# Patient Record
Sex: FEMALE | Race: WHITE | NOT HISPANIC OR LATINO | RURAL
[De-identification: names, ages, dates, MRNs, and addresses within clinical notes are randomized per-mention and may not be internally consistent; named-entity substitution may affect disease eponyms.]

---

## 2023-02-13 ENCOUNTER — TELEPHONE ENCOUNTER (OUTPATIENT)
Dept: RURAL CLINIC 2 | Facility: CLINIC | Age: 38
End: 2023-02-13

## 2023-02-16 ENCOUNTER — OFFICE VISIT (OUTPATIENT)
Dept: URBAN - METROPOLITAN AREA CLINIC 105 | Facility: CLINIC | Age: 38
End: 2023-02-16

## 2023-02-27 ENCOUNTER — WEB ENCOUNTER (OUTPATIENT)
Dept: RURAL CLINIC 2 | Facility: CLINIC | Age: 38
End: 2023-02-27

## 2023-02-27 ENCOUNTER — OFFICE VISIT (OUTPATIENT)
Dept: RURAL CLINIC 2 | Facility: CLINIC | Age: 38
End: 2023-02-27
Payer: COMMERCIAL

## 2023-02-27 ENCOUNTER — CLAIMS CREATED FROM THE CLAIM WINDOW (OUTPATIENT)
Dept: RURAL CLINIC 2 | Facility: CLINIC | Age: 38
End: 2023-02-27
Payer: COMMERCIAL

## 2023-02-27 VITALS
DIASTOLIC BLOOD PRESSURE: 97 MMHG | SYSTOLIC BLOOD PRESSURE: 136 MMHG | TEMPERATURE: 96.8 F | BODY MASS INDEX: 36.19 KG/M2 | HEIGHT: 64 IN | WEIGHT: 212 LBS | HEART RATE: 109 BPM

## 2023-02-27 DIAGNOSIS — R19.5 MUCUS IN STOOL: ICD-10-CM

## 2023-02-27 DIAGNOSIS — R12 HEARTBURN: ICD-10-CM

## 2023-02-27 DIAGNOSIS — R11.0 NAUSEA: ICD-10-CM

## 2023-02-27 DIAGNOSIS — R10.9 RIGHT SIDED ABDOMINAL PAIN: ICD-10-CM

## 2023-02-27 DIAGNOSIS — R10.84 ABDOMINAL CRAMPING, GENERALIZED: ICD-10-CM

## 2023-02-27 PROBLEM — 285388000: Status: ACTIVE | Noted: 2023-02-27

## 2023-02-27 PROBLEM — 422587007: Status: ACTIVE | Noted: 2023-02-27

## 2023-02-27 PROBLEM — 16331000: Status: ACTIVE | Noted: 2023-02-27

## 2023-02-27 PROBLEM — 271864008: Status: ACTIVE | Noted: 2023-02-27

## 2023-02-27 PROCEDURE — 99243 OFF/OP CNSLTJ NEW/EST LOW 30: CPT | Performed by: INTERNAL MEDICINE

## 2023-02-27 PROCEDURE — 99243 OFF/OP CNSLTJ NEW/EST LOW 30: CPT | Performed by: NURSE PRACTITIONER

## 2023-02-27 PROCEDURE — 99203 OFFICE O/P NEW LOW 30 MIN: CPT | Performed by: INTERNAL MEDICINE

## 2023-02-27 RX ORDER — BUSPIRONE HYDROCHLORIDE 7.5 MG/1
1 TABLET TABLET ORAL TWICE A DAY
Status: ACTIVE | COMMUNITY

## 2023-02-27 RX ORDER — DOXYCYCLINE HYCLATE 100 MG/1
1 CAPSULE CAPSULE, GELATIN COATED ORAL ONCE A DAY
Status: ACTIVE | COMMUNITY

## 2023-02-27 RX ORDER — FLUOXETINE HYDROCHLORIDE 10 MG/1
1 CAPSULE CAPSULE ORAL ONCE A DAY
Status: ACTIVE | COMMUNITY

## 2023-02-27 RX ORDER — METFORMIN HYDROCHLORIDE 500 MG/1
1 TABLET WITH A MEAL TABLET, FILM COATED ORAL ONCE A DAY
Status: ACTIVE | COMMUNITY

## 2023-02-27 NOTE — HPI-TODAY'S VISIT:
The patient is a 37-year-old female who presents on referral from Stefanie Gilbert NP for abdominal pain and heartburn.  A copy of this document to be sent to the referring provider.  The patient was seen at [facility] emergency room in mid January for abdominal pain, nausea and  diarrhea and noncontrast CT scan of the abdomen revealed questionable mesenteric enteritis otherwise unremarkable.  After approximately 1-2 weeks her symptoms improved significantly.  She denies any further diarrhea.  She reports mild right-sided discomfort occurring infrequently.  Her bowel pattern is back to normal indicating 3-4 bowel movements per day typically starting out as solid and by the end of the day will be soft.  She denies melena or rectal bleeding.  She was treated for heartburn with omeprazole and made her sick as well as caused diarrhea.  She continues with intermittent heartburn a few times a week.  She recalls undergoing both an upper endoscopy and colonoscopy approximately 15 years ago for upper and lower GI symptoms at that time and essentially normal.  She denies a family history of digestive malignancies.

## 2023-05-10 ENCOUNTER — OFFICE VISIT (OUTPATIENT)
Dept: RURAL CLINIC 8 | Facility: CLINIC | Age: 38
End: 2023-05-10
Payer: COMMERCIAL

## 2023-05-10 ENCOUNTER — DASHBOARD ENCOUNTERS (OUTPATIENT)
Age: 38
End: 2023-05-10

## 2023-05-10 ENCOUNTER — LAB OUTSIDE AN ENCOUNTER (OUTPATIENT)
Dept: RURAL CLINIC 8 | Facility: CLINIC | Age: 38
End: 2023-05-10

## 2023-05-10 ENCOUNTER — WEB ENCOUNTER (OUTPATIENT)
Dept: RURAL CLINIC 2 | Facility: CLINIC | Age: 38
End: 2023-05-10

## 2023-05-10 VITALS
HEIGHT: 64 IN | SYSTOLIC BLOOD PRESSURE: 125 MMHG | DIASTOLIC BLOOD PRESSURE: 91 MMHG | TEMPERATURE: 97.8 F | HEART RATE: 99 BPM | BODY MASS INDEX: 36.88 KG/M2 | WEIGHT: 216 LBS

## 2023-05-10 DIAGNOSIS — R10.9 RIGHT SIDED ABDOMINAL PAIN: ICD-10-CM

## 2023-05-10 DIAGNOSIS — I88.0 MESENTERIC ADENITIS: ICD-10-CM

## 2023-05-10 DIAGNOSIS — K21.9 GERD WITHOUT ESOPHAGITIS: ICD-10-CM

## 2023-05-10 DIAGNOSIS — R12 HEARTBURN: ICD-10-CM

## 2023-05-10 DIAGNOSIS — R19.5 LOOSE STOOLS: ICD-10-CM

## 2023-05-10 DIAGNOSIS — Z90.49 HISTORY OF CHOLECYSTECTOMY: ICD-10-CM

## 2023-05-10 DIAGNOSIS — R19.7 DIARRHEA, UNSPECIFIED TYPE: ICD-10-CM

## 2023-05-10 PROBLEM — 428882003: Status: ACTIVE | Noted: 2023-05-10

## 2023-05-10 PROBLEM — 266435005: Status: ACTIVE | Noted: 2023-05-10

## 2023-05-10 PROCEDURE — 99214 OFFICE O/P EST MOD 30 MIN: CPT | Performed by: INTERNAL MEDICINE

## 2023-05-10 RX ORDER — DOXYCYCLINE HYCLATE 100 MG/1
1 CAPSULE CAPSULE, GELATIN COATED ORAL ONCE A DAY
Status: DISCONTINUED | COMMUNITY

## 2023-05-10 RX ORDER — BISACODYL 5 MG
TAKE 4 TABLET, DELAYED RELEASE (ENTERIC COATED) ORAL
Qty: 4 | OUTPATIENT
Start: 2023-05-10 | End: 2023-05-11

## 2023-05-10 RX ORDER — METFORMIN HYDROCHLORIDE 500 MG/1
1 TABLET WITH A MEAL TABLET, FILM COATED ORAL ONCE A DAY
Status: DISCONTINUED | COMMUNITY

## 2023-05-10 RX ORDER — FLUOXETINE HYDROCHLORIDE 10 MG/1
1 CAPSULE CAPSULE ORAL ONCE A DAY
Status: DISCONTINUED | COMMUNITY

## 2023-05-10 RX ORDER — SODIUM, POTASSIUM,MAG SULFATES 17.5-3.13G
177 ML SOLUTION, RECONSTITUTED, ORAL ORAL
Qty: 1 KIT | Refills: 0 | OUTPATIENT
Start: 2023-05-10 | End: 2023-05-11

## 2023-05-10 RX ORDER — BUSPIRONE HYDROCHLORIDE 7.5 MG/1
1 TABLET TABLET ORAL TWICE A DAY
Status: ACTIVE | COMMUNITY

## 2023-05-10 NOTE — HPI-TODAY'S VISIT:
The patient is a 37-year-old female who presents on referral from Stefanie Gilbert NP for abdominal pain and heartburn.  A copy of this document to be sent to the referring provider.  The patient was seen at [facility] emergency room in mid January for abdominal pain, nausea and  diarrhea and noncontrast CT scan of the abdomen revealed questionable mesenteric enteritis otherwise unremarkable.  After approximately 1-2 weeks her symptoms improved significantly.  She denies any further diarrhea.  She reports mild right-sided discomfort occurring infrequently.  Her bowel pattern is back to normal indicating 3-4 bowel movements per day typically starting out as solid and by the end of the day will be soft.  She denies melena or rectal bleeding.  She was treated for heartburn with omeprazole and made her sick as well as caused diarrhea.  She continues with intermittent heartburn a few times a week.  She recalls undergoing both an upper endoscopy and colonoscopy approximately 15 years ago for upper and lower GI symptoms at that time and essentially normal.  She denies a family history of digestive malignancies. -  05/10/2023: Patient returns for follow-up.  She was seen about 8 weeks or so by NP.  She had developed abdominal pain and was seen in the ER at [facility] were CT suggested mesenteric adenitis.  At that time were symptoms have markedly improved.  She returns to the office today for interval follow-up. "nel always had stomach issues since I was a child". - things are better than when she was in ER.  she describes right sided abdominal pain that "wraps around to my back".  mainly has heartburn bloating and "dumping syndrome".  when the right sided abdominal pain the diarrhea gets worse.  - pt reports to have loose stools multiple times a day, very gassy. soemtimes even before finishing a meal, she has to get up and have diarrhea. she occasionally has a normal stool on her period. vast majority of her bowel movements are loose.

## 2023-06-15 ENCOUNTER — LAB OUTSIDE AN ENCOUNTER (OUTPATIENT)
Dept: URBAN - METROPOLITAN AREA CLINIC 105 | Facility: CLINIC | Age: 38
End: 2023-06-15

## 2023-06-15 ENCOUNTER — TELEPHONE ENCOUNTER (OUTPATIENT)
Dept: RURAL CLINIC 2 | Facility: CLINIC | Age: 38
End: 2023-06-15

## 2023-06-15 RX ORDER — BUSPIRONE HYDROCHLORIDE 7.5 MG/1
1 TABLET TABLET ORAL TWICE A DAY
COMMUNITY

## 2023-06-18 LAB
ADENOVIRUS F 40/41: NOT DETECTED
CALPROTECTIN, STOOL - QDX: (no result)
CAMPYLOBACTER: NOT DETECTED
CLOSTRIDIUM DIFFICILE: NOT DETECTED
CRYPTOSPORIDIUM: NOT DETECTED
CYCLOSPORA CAYETANESIS: NOT DETECTED
E. COLI O157: (no result)
ENTAMOEBA HISTOLYTICA: NOT DETECTED
ENTAMOEBA HISTOLYTICA: NOT DETECTED
ENTEROAGGREGATIVE E.COLI: NOT DETECTED
ENTEROTOXIGENIC E.COLI: NOT DETECTED
ESCHERICHIA COLI O157: NOT DETECTED
FECAL FAT, QUALITATIVE: (no result)
GIARDIA LAMBLIA: NOT DETECTED
NOROVIRUS GI/GII: NOT DETECTED
NOROVIRUS GI/GII: NOT DETECTED
PANCREATICELASTASE ELISA, STOOL: (no result)
ROTAVIRUS A: NOT DETECTED
SHIGA-LIKE TOXIN PRODUCING E.COLI: NOT DETECTED
SHIGA-LIKE TOXIN PRODUCING E.COLI: NOT DETECTED
SHIGELLA SPP. / ENTEROINVASIVE E.COLI: NOT DETECTED
VIBRIO CHOLERAE: NOT DETECTED
VIBRIO SPP.: NOT DETECTED
YERSINIA ENTEROCOLITICA: NOT DETECTED
YERSINIA ENTEROCOLITICA: NOT DETECTED

## 2023-06-21 ENCOUNTER — OFFICE VISIT (OUTPATIENT)
Dept: RURAL MEDICAL CENTER 4 | Facility: MEDICAL CENTER | Age: 38
End: 2023-06-21
Payer: COMMERCIAL

## 2023-06-21 DIAGNOSIS — K29.60 ADENOPAPILLOMATOSIS GASTRICA: ICD-10-CM

## 2023-06-21 DIAGNOSIS — K22.89 DILATATION OF ESOPHAGUS: ICD-10-CM

## 2023-06-21 DIAGNOSIS — K63.89 APPENDICITIS EPIPLOICA: ICD-10-CM

## 2023-06-21 DIAGNOSIS — R10.84 ABDOMINAL CRAMPING, GENERALIZED: ICD-10-CM

## 2023-06-21 DIAGNOSIS — R19.7 ACUTE DIARRHEA: ICD-10-CM

## 2023-06-21 DIAGNOSIS — K31.7 BENIGN GASTRIC POLYP: ICD-10-CM

## 2023-06-21 PROCEDURE — 45380 COLONOSCOPY AND BIOPSY: CPT | Performed by: INTERNAL MEDICINE

## 2023-06-21 PROCEDURE — 43239 EGD BIOPSY SINGLE/MULTIPLE: CPT | Performed by: INTERNAL MEDICINE

## 2023-06-21 RX ORDER — BUSPIRONE HYDROCHLORIDE 7.5 MG/1
1 TABLET TABLET ORAL TWICE A DAY
COMMUNITY

## 2023-06-23 ENCOUNTER — TELEPHONE ENCOUNTER (OUTPATIENT)
Dept: RURAL CLINIC 2 | Facility: CLINIC | Age: 38
End: 2023-06-23

## 2023-07-14 ENCOUNTER — TELEPHONE ENCOUNTER (OUTPATIENT)
Dept: RURAL CLINIC 2 | Facility: CLINIC | Age: 38
End: 2023-07-14